# Patient Record
Sex: FEMALE | URBAN - METROPOLITAN AREA
[De-identification: names, ages, dates, MRNs, and addresses within clinical notes are randomized per-mention and may not be internally consistent; named-entity substitution may affect disease eponyms.]

---

## 2023-02-16 ENCOUNTER — APPOINTMENT (RX ONLY)
Dept: URBAN - METROPOLITAN AREA CLINIC 151 | Facility: CLINIC | Age: 53
Setting detail: DERMATOLOGY
End: 2023-02-16

## 2023-02-16 DIAGNOSIS — L30.9 DERMATITIS, UNSPECIFIED: ICD-10-CM | Status: INADEQUATELY CONTROLLED

## 2023-02-16 PROCEDURE — ? COUNSELING

## 2023-02-16 PROCEDURE — ? PRESCRIPTION MEDICATION MANAGEMENT

## 2023-02-16 PROCEDURE — ? DIAGNOSIS COMMENT

## 2023-02-16 PROCEDURE — ? ORDER TESTS

## 2023-02-16 PROCEDURE — 99204 OFFICE O/P NEW MOD 45 MIN: CPT

## 2023-02-16 PROCEDURE — ? PRESCRIPTION

## 2023-02-16 RX ORDER — PIMECROLIMUS 10 MG/G
CREAM TOPICAL
Qty: 30 | Refills: 1 | Status: CANCELLED | COMMUNITY
Start: 2023-02-16

## 2023-02-16 RX ORDER — RUXOLITINIB 15 MG/G
CREAM TOPICAL
Qty: 60 | Refills: 1 | Status: ERX | COMMUNITY
Start: 2023-02-16

## 2023-02-16 RX ADMIN — RUXOLITINIB: 15 CREAM TOPICAL at 00:00

## 2023-02-16 RX ADMIN — PIMECROLIMUS: 10 CREAM TOPICAL at 00:00

## 2023-02-16 ASSESSMENT — LOCATION ZONE DERM
LOCATION ZONE: FACE
LOCATION ZONE: NECK

## 2023-02-16 ASSESSMENT — LOCATION SIMPLE DESCRIPTION DERM
LOCATION SIMPLE: RIGHT FOREHEAD
LOCATION SIMPLE: RIGHT CHEEK
LOCATION SIMPLE: LEFT ANTERIOR NECK

## 2023-02-16 ASSESSMENT — LOCATION DETAILED DESCRIPTION DERM
LOCATION DETAILED: LEFT INFERIOR ANTERIOR NECK
LOCATION DETAILED: RIGHT INFERIOR CENTRAL MALAR CHEEK
LOCATION DETAILED: RIGHT INFERIOR MEDIAL FOREHEAD

## 2023-02-16 NOTE — PROCEDURE: PRESCRIPTION MEDICATION MANAGEMENT
Initiate Treatment: - opzelura 1.5% cream bid\\n- Elidel cream bid
Detail Level: Zone
Render In Strict Bullet Format?: No

## 2023-02-16 NOTE — PROCEDURE: DIAGNOSIS COMMENT
Detail Level: Detailed
Render Risk Assessment In Note?: no
Comment: Rash ongoing for 5 months. No improvement with prior treatments including topical Desonide, TAC, and Tacrolimus 0.3% ointment. Discussed ddx includes eczematous eruption (favor ACD > ICD) vs r/o lupus vs DM. Considered, but not favored is rosacea vs periorificial dermatitis given involvement on neck and morphology (erythematous scaly patches, no papules). Discussed biopsy for further work-up which pt deferred for now. Will obtain baseline lab work to work-up autoimmune causes as below. Pt additionally requesting referral to Allergy for patch testing, information provided for [facility]. Recommend gentle, bland skin care while rash is flaring (d/c creams which pt is currently using with bergamot and rina oils) to prevent exacerbation. Sample provided for topical opzelura to use BID given favor eczematous eruption, prescription also sent. If not tolerating sample, prescription also sent for topical Elidel (discussed pt should not use both medications at same time). If no improvement at close follow-up will consider oral prednisone (discussed today, but deferred as pt is going to set up patch testing) vs biopsy.

## 2023-02-16 NOTE — HPI: RASH
What Type Of Note Output Would You Prefer (Optional)?: Standard Output
How Severe Is Your Rash?: moderate
Is This A New Presentation, Or A Follow-Up?: Rash
Additional History: Pt has tried Desonide, Protopic, and triamcinolone w/o improvement. Pt notes when she used Rx, they caused burning sensation

## 2023-02-16 NOTE — PROCEDURE: ORDER TESTS
Billing Type: Third-Party Bill
Performing Laboratory: 0
Bill For Surgical Tray: no
Expected Date Of Service: 02/16/2023

## 2023-02-16 NOTE — PROCEDURE: MIPS QUALITY
Quality 110: Preventive Care And Screening: Influenza Immunization: Influenza Immunization not Administered for Documented Reasons.
Detail Level: Detailed
Additional Notes: Pt has not gotten flu shot from PCP or pharmacy

## 2023-03-09 ENCOUNTER — APPOINTMENT (RX ONLY)
Dept: URBAN - METROPOLITAN AREA CLINIC 151 | Facility: CLINIC | Age: 53
Setting detail: DERMATOLOGY
End: 2023-03-09

## 2023-03-09 DIAGNOSIS — L20.89 OTHER ATOPIC DERMATITIS: ICD-10-CM | Status: IMPROVED

## 2023-03-09 PROBLEM — L20.84 INTRINSIC (ALLERGIC) ECZEMA: Status: ACTIVE | Noted: 2023-03-09

## 2023-03-09 PROCEDURE — ? ORDER TESTS

## 2023-03-09 PROCEDURE — ? COUNSELING

## 2023-03-09 PROCEDURE — ? PRESCRIPTION MEDICATION MANAGEMENT

## 2023-03-09 PROCEDURE — 99214 OFFICE O/P EST MOD 30 MIN: CPT

## 2023-03-09 PROCEDURE — ? DIAGNOSIS COMMENT

## 2023-03-09 NOTE — PROCEDURE: ORDER TESTS
Expected Date Of Service: 03/09/2023
Performing Laboratory: [id number]
Billing Type: Third-Party Bill
Lab Facility: 0
Bill For Surgical Tray: no

## 2023-03-09 NOTE — PROCEDURE: MIPS QUALITY
Quality 110: Preventive Care And Screening: Influenza Immunization: Influenza Immunization not Administered for Documented Reasons.
Quality 226: Preventive Care And Screening: Tobacco Use: Screening And Cessation Intervention: Tobacco Screening not Performed
Detail Level: Detailed
Additional Notes: Pt has not gotten flu shot from PCP or pharmacy
Quality 130: Documentation Of Current Medications In The Medical Record: Current Medications Documented
Quality 431: Preventive Care And Screening: Unhealthy Alcohol Use - Screening: Patient not identified as an unhealthy alcohol user when screened for unhealthy alcohol use using a systematic screening method

## 2023-03-09 NOTE — PROCEDURE: DIAGNOSIS COMMENT
Detail Level: Detailed
Render Risk Assessment In Note?: no
Comment: Rash x 5 months, cleared within topical opzelura then re-flared once pt started using her makeup/normal products. Favor eczematous eruption (ACD > ICD). For current mild flare, recommend re-starting topical opzelura daily until cleared. Recommend patch testing with Dr. Magaña to identify trigger in her products that is flaring rash. Recommend completing work-up as ordered at last visit, labs re-ordered.